# Patient Record
Sex: FEMALE | Race: WHITE | URBAN - METROPOLITAN AREA
[De-identification: names, ages, dates, MRNs, and addresses within clinical notes are randomized per-mention and may not be internally consistent; named-entity substitution may affect disease eponyms.]

---

## 2020-02-05 ENCOUNTER — APPOINTMENT (RX ONLY)
Dept: URBAN - METROPOLITAN AREA CLINIC 108 | Facility: CLINIC | Age: 55
Setting detail: DERMATOLOGY
End: 2020-02-05

## 2020-02-05 DIAGNOSIS — L81.1 CHLOASMA: ICD-10-CM

## 2020-02-05 DIAGNOSIS — L91.8 OTHER HYPERTROPHIC DISORDERS OF THE SKIN: ICD-10-CM

## 2020-02-05 DIAGNOSIS — D18.0 HEMANGIOMA: ICD-10-CM

## 2020-02-05 PROBLEM — D18.01 HEMANGIOMA OF SKIN AND SUBCUTANEOUS TISSUE: Status: ACTIVE | Noted: 2020-02-05

## 2020-02-05 PROCEDURE — 99203 OFFICE O/P NEW LOW 30 MIN: CPT

## 2020-02-05 PROCEDURE — ? COUNSELING

## 2020-02-05 PROCEDURE — ? PRESCRIPTION

## 2020-02-05 ASSESSMENT — LOCATION SIMPLE DESCRIPTION DERM
LOCATION SIMPLE: LEFT CHEEK
LOCATION SIMPLE: RIGHT ANTERIOR NECK
LOCATION SIMPLE: CHEST

## 2020-02-05 ASSESSMENT — LOCATION ZONE DERM
LOCATION ZONE: TRUNK
LOCATION ZONE: FACE
LOCATION ZONE: NECK

## 2020-02-05 ASSESSMENT — LOCATION DETAILED DESCRIPTION DERM
LOCATION DETAILED: MIDDLE STERNUM
LOCATION DETAILED: RIGHT INFERIOR LATERAL NECK
LOCATION DETAILED: LEFT INFERIOR CENTRAL MALAR CHEEK

## 2021-12-09 ENCOUNTER — APPOINTMENT (RX ONLY)
Dept: URBAN - METROPOLITAN AREA CLINIC 108 | Facility: CLINIC | Age: 56
Setting detail: DERMATOLOGY
End: 2021-12-09

## 2021-12-09 ENCOUNTER — RX ONLY (OUTPATIENT)
Age: 56
Setting detail: RX ONLY
End: 2021-12-09

## 2021-12-09 DIAGNOSIS — L81.0 POSTINFLAMMATORY HYPERPIGMENTATION: ICD-10-CM

## 2021-12-09 DIAGNOSIS — L70.0 ACNE VULGARIS: ICD-10-CM

## 2021-12-09 DIAGNOSIS — L65.9 NONSCARRING HAIR LOSS, UNSPECIFIED: ICD-10-CM

## 2021-12-09 PROCEDURE — ? ORDER TESTS

## 2021-12-09 PROCEDURE — ? ADDITIONAL NOTES

## 2021-12-09 PROCEDURE — 11901 INJECT SKIN LESIONS >7: CPT

## 2021-12-09 PROCEDURE — 99214 OFFICE O/P EST MOD 30 MIN: CPT | Mod: 25

## 2021-12-09 PROCEDURE — ? FULL BODY SKIN EXAM - DECLINED

## 2021-12-09 PROCEDURE — ? PRESCRIPTION

## 2021-12-09 PROCEDURE — ? COUNSELING

## 2021-12-09 PROCEDURE — ? INTRALESIONAL KENALOG

## 2021-12-09 RX ORDER — TACROLIMUS 1 MG/G
OINTMENT TOPICAL
Qty: 60 | Refills: 3 | Status: ERX | COMMUNITY
Start: 2021-12-09

## 2021-12-09 RX ORDER — HALOBETASOL PROPIONATE 0.5 MG/G
AEROSOL, FOAM TOPICAL
Qty: 50 | Refills: 3 | Status: CANCELLED
Stop reason: CLARIF

## 2021-12-09 RX ORDER — TRETIONIN 0.25 MG/G
CREAM TOPICAL QHS
Qty: 45 | Refills: 3 | Status: ERX | COMMUNITY
Start: 2021-12-09

## 2021-12-09 RX ADMIN — TRETIONIN: 0.25 CREAM TOPICAL at 00:00

## 2021-12-09 RX ADMIN — TACROLIMUS: 1 OINTMENT TOPICAL at 00:00

## 2021-12-09 ASSESSMENT — LOCATION DETAILED DESCRIPTION DERM
LOCATION DETAILED: LEFT SUPERIOR LATERAL FOREHEAD
LOCATION DETAILED: SUPERIOR MID FOREHEAD
LOCATION DETAILED: LEFT INFERIOR MEDIAL FOREHEAD
LOCATION DETAILED: LEFT MEDIAL FOREHEAD
LOCATION DETAILED: LEFT LATERAL FOREHEAD
LOCATION DETAILED: RIGHT CENTRAL FRONTAL SCALP
LOCATION DETAILED: INFERIOR MID FOREHEAD
LOCATION DETAILED: RIGHT MEDIAL FOREHEAD
LOCATION DETAILED: HAIR
LOCATION DETAILED: RIGHT INFERIOR MEDIAL FOREHEAD

## 2021-12-09 ASSESSMENT — LOCATION ZONE DERM
LOCATION ZONE: FACE
LOCATION ZONE: SCALP

## 2021-12-09 ASSESSMENT — LOCATION SIMPLE DESCRIPTION DERM
LOCATION SIMPLE: LEFT FOREHEAD
LOCATION SIMPLE: INFERIOR FOREHEAD
LOCATION SIMPLE: RIGHT FOREHEAD
LOCATION SIMPLE: SCALP
LOCATION SIMPLE: HAIR
LOCATION SIMPLE: SUPERIOR FOREHEAD

## 2021-12-09 NOTE — PROCEDURE: ORDER TESTS
Performing Laboratory: -[id number]
Bill For Surgical Tray: no
Billing Type: Third-Party Bill
Expected Date Of Service: 12/09/2021

## 2021-12-09 NOTE — PROCEDURE: COUNSELING
Detail Level: Detailed
Topical Clindamycin Pregnancy And Lactation Text: This medication is Pregnancy Category B and is considered safe during pregnancy. It is unknown if it is excreted in breast milk.
Use Enhanced Medication Counseling?: No
Birth Control Pills Counseling: Birth Control Pill Counseling: I discussed with the patient the potential side effects of OCPs including but not limited to increased risk of stroke, heart attack, thrombophlebitis, deep venous thrombosis, hepatic adenomas, breast changes, GI upset, headaches, and depression.  The patient verbalized understanding of the proper use and possible adverse effects of OCPs. All of the patient's questions and concerns were addressed.
Tetracycline Counseling: Patient counseled regarding possible photosensitivity and increased risk for sunburn.  Patient instructed to avoid sunlight, if possible.  When exposed to sunlight, patients should wear protective clothing, sunglasses, and sunscreen.  The patient was instructed to call the office immediately if the following severe adverse effects occur:  hearing changes, easy bruising/bleeding, severe headache, or vision changes.  The patient verbalized understanding of the proper use and possible adverse effects of tetracycline.  All of the patient's questions and concerns were addressed. Patient understands to avoid pregnancy while on therapy due to potential birth defects.
Sarecycline Counseling: Patient advised regarding possible photosensitivity and discoloration of the teeth, skin, lips, tongue and gums.  Patient instructed to avoid sunlight, if possible.  When exposed to sunlight, patients should wear protective clothing, sunglasses, and sunscreen.  The patient was instructed to call the office immediately if the following severe adverse effects occur:  hearing changes, easy bruising/bleeding, severe headache, or vision changes.  The patient verbalized understanding of the proper use and possible adverse effects of sarecycline.  All of the patient's questions and concerns were addressed.
Isotretinoin Pregnancy And Lactation Text: This medication is Pregnancy Category X and is considered extremely dangerous during pregnancy. It is unknown if it is excreted in breast milk.
Topical Retinoid Pregnancy And Lactation Text: This medication is Pregnancy Category C. It is unknown if this medication is excreted in breast milk.
Doxycycline Pregnancy And Lactation Text: This medication is Pregnancy Category D and not consider safe during pregnancy. It is also excreted in breast milk but is considered safe for shorter treatment courses.
Azithromycin Counseling:  I discussed with the patient the risks of azithromycin including but not limited to GI upset, allergic reaction, drug rash, diarrhea, and yeast infections.
Birth Control Pills Pregnancy And Lactation Text: This medication should be avoided if pregnant and for the first 30 days post-partum.
Tetracycline Pregnancy And Lactation Text: This medication is Pregnancy Category D and not consider safe during pregnancy. It is also excreted in breast milk.
High Dose Vitamin A Counseling: Side effects reviewed, pt to contact office should one occur.
Topical Sulfur Applications Counseling: Topical Sulfur Counseling: Patient counseled that this medication may cause skin irritation or allergic reactions.  In the event of skin irritation, the patient was advised to reduce the amount of the drug applied or use it less frequently.   The patient verbalized understanding of the proper use and possible adverse effects of topical sulfur application.  All of the patient's questions and concerns were addressed.
Tazorac Counseling:  Patient advised that medication is irritating and drying.  Patient may need to apply sparingly and wash off after an hour before eventually leaving it on overnight.  The patient verbalized understanding of the proper use and possible adverse effects of tazorac.  All of the patient's questions and concerns were addressed.
Erythromycin Counseling:  I discussed with the patient the risks of erythromycin including but not limited to GI upset, allergic reaction, drug rash, diarrhea, increase in liver enzymes, and yeast infections.
Azithromycin Pregnancy And Lactation Text: This medication is considered safe during pregnancy and is also secreted in breast milk.
Dapsone Counseling: I discussed with the patient the risks of dapsone including but not limited to hemolytic anemia, agranulocytosis, rashes, methemoglobinemia, kidney failure, peripheral neuropathy, headaches, GI upset, and liver toxicity.  Patients who start dapsone require monitoring including baseline LFTs and weekly CBCs for the first month, then every month thereafter.  The patient verbalized understanding of the proper use and possible adverse effects of dapsone.  All of the patient's questions and concerns were addressed.
Benzoyl Peroxide Counseling: Patient counseled that medicine may cause skin irritation and bleach clothing.  In the event of skin irritation, the patient was advised to reduce the amount of the drug applied or use it less frequently.   The patient verbalized understanding of the proper use and possible adverse effects of benzoyl peroxide.  All of the patient's questions and concerns were addressed.
High Dose Vitamin A Pregnancy And Lactation Text: High dose vitamin A therapy is contraindicated during pregnancy and breast feeding.
Topical Sulfur Applications Pregnancy And Lactation Text: This medication is Pregnancy Category C and has an unknown safety profile during pregnancy. It is unknown if this topical medication is excreted in breast milk.
Erythromycin Pregnancy And Lactation Text: This medication is Pregnancy Category B and is considered safe during pregnancy. It is also excreted in breast milk.
Spironolactone Counseling: Patient advised regarding risks of diarrhea, abdominal pain, hyperkalemia, birth defects (for female patients), liver toxicity and renal toxicity. The patient may need blood work to monitor liver and kidney function and potassium levels while on therapy. The patient verbalized understanding of the proper use and possible adverse effects of spironolactone.  All of the patient's questions and concerns were addressed.
Tazorac Pregnancy And Lactation Text: This medication is not safe during pregnancy. It is unknown if this medication is excreted in breast milk.
Bactrim Counseling:  I discussed with the patient the risks of sulfa antibiotics including but not limited to GI upset, allergic reaction, drug rash, diarrhea, dizziness, photosensitivity, and yeast infections.  Rarely, more serious reactions can occur including but not limited to aplastic anemia, agranulocytosis, methemoglobinemia, blood dyscrasias, liver or kidney failure, lung infiltrates or desquamative/blistering drug rashes.
Benzoyl Peroxide Pregnancy And Lactation Text: This medication is Pregnancy Category C. It is unknown if benzoyl peroxide is excreted in breast milk.
Minocycline Counseling: Patient advised regarding possible photosensitivity and discoloration of the teeth, skin, lips, tongue and gums.  Patient instructed to avoid sunlight, if possible.  When exposed to sunlight, patients should wear protective clothing, sunglasses, and sunscreen.  The patient was instructed to call the office immediately if the following severe adverse effects occur:  hearing changes, easy bruising/bleeding, severe headache, or vision changes.  The patient verbalized understanding of the proper use and possible adverse effects of minocycline.  All of the patient's questions and concerns were addressed.
Dapsone Pregnancy And Lactation Text: This medication is Pregnancy Category C and is not considered safe during pregnancy or breast feeding.
Topical Clindamycin Counseling: Patient counseled that this medication may cause skin irritation or allergic reactions.  In the event of skin irritation, the patient was advised to reduce the amount of the drug applied or use it less frequently.   The patient verbalized understanding of the proper use and possible adverse effects of clindamycin.  All of the patient's questions and concerns were addressed.
Bactrim Pregnancy And Lactation Text: This medication is Pregnancy Category D and is known to cause fetal risk.  It is also excreted in breast milk.
Topical Retinoid counseling:  Patient advised to apply a pea-sized amount only at bedtime and wait 30 minutes after washing their face before applying.  If too drying, patient may add a non-comedogenic moisturizer. The patient verbalized understanding of the proper use and possible adverse effects of retinoids.  All of the patient's questions and concerns were addressed.
Spironolactone Pregnancy And Lactation Text: This medication can cause feminization of the male fetus and should be avoided during pregnancy. The active metabolite is also found in breast milk.
Isotretinoin Counseling: Patient should get monthly blood tests, not donate blood, not drive at night if vision affected, not share medication, and not undergo elective surgery for 6 months after tx completed. Side effects reviewed, pt to contact office should one occur.
Doxycycline Counseling:  Patient counseled regarding possible photosensitivity and increased risk for sunburn.  Patient instructed to avoid sunlight, if possible.  When exposed to sunlight, patients should wear protective clothing, sunglasses, and sunscreen.  The patient was instructed to call the office immediately if the following severe adverse effects occur:  hearing changes, easy bruising/bleeding, severe headache, or vision changes.  The patient verbalized understanding of the proper use and possible adverse effects of doxycycline.  All of the patient's questions and concerns were addressed.

## 2022-01-20 ENCOUNTER — APPOINTMENT (RX ONLY)
Dept: URBAN - METROPOLITAN AREA CLINIC 108 | Facility: CLINIC | Age: 57
Setting detail: DERMATOLOGY
End: 2022-01-20

## 2022-01-20 ENCOUNTER — RX ONLY (OUTPATIENT)
Age: 57
Setting detail: RX ONLY
End: 2022-01-20

## 2022-01-20 DIAGNOSIS — L65.9 NONSCARRING HAIR LOSS, UNSPECIFIED: ICD-10-CM

## 2022-01-20 PROCEDURE — ? ADDITIONAL NOTES

## 2022-01-20 PROCEDURE — 11901 INJECT SKIN LESIONS >7: CPT

## 2022-01-20 PROCEDURE — ? PATIENT SPECIFIC COUNSELING

## 2022-01-20 PROCEDURE — ? INTRALESIONAL KENALOG

## 2022-01-20 PROCEDURE — ? COUNSELING

## 2022-01-20 PROCEDURE — ? FULL BODY SKIN EXAM - DECLINED

## 2022-01-20 PROCEDURE — ? PRESCRIPTION MEDICATION MANAGEMENT

## 2022-01-20 RX ORDER — TRETIONIN 0.25 MG/G
CREAM TOPICAL QHS
Qty: 45 | Refills: 3 | Status: ERX

## 2022-01-20 ASSESSMENT — LOCATION SIMPLE DESCRIPTION DERM
LOCATION SIMPLE: SUPERIOR FOREHEAD
LOCATION SIMPLE: SCALP
LOCATION SIMPLE: RIGHT FOREHEAD
LOCATION SIMPLE: INFERIOR FOREHEAD
LOCATION SIMPLE: HAIR
LOCATION SIMPLE: LEFT FOREHEAD

## 2022-01-20 ASSESSMENT — LOCATION DETAILED DESCRIPTION DERM
LOCATION DETAILED: INFERIOR MID FOREHEAD
LOCATION DETAILED: HAIR
LOCATION DETAILED: LEFT MEDIAL FOREHEAD
LOCATION DETAILED: LEFT INFERIOR MEDIAL FOREHEAD
LOCATION DETAILED: RIGHT MEDIAL FOREHEAD
LOCATION DETAILED: LEFT SUPERIOR LATERAL FOREHEAD
LOCATION DETAILED: RIGHT CENTRAL FRONTAL SCALP
LOCATION DETAILED: LEFT LATERAL FOREHEAD
LOCATION DETAILED: SUPERIOR MID FOREHEAD

## 2022-01-20 ASSESSMENT — LOCATION ZONE DERM
LOCATION ZONE: FACE
LOCATION ZONE: SCALP

## 2022-01-20 NOTE — PROCEDURE: PRESCRIPTION MEDICATION MANAGEMENT
Render In Strict Bullet Format?: No
Detail Level: Zone
Plan: Continue applying tacrolimus BID and Men’s Rogaine QHS.

## 2022-01-20 NOTE — PROCEDURE: PATIENT SPECIFIC COUNSELING
Reviewed blood work with patient and informed to see Rheumatologist. \\[facility] PB Reviewed blood work with patient and informed to see Rheumatologist. \\[facility] PB

## 2022-03-03 ENCOUNTER — APPOINTMENT (RX ONLY)
Dept: URBAN - METROPOLITAN AREA CLINIC 108 | Facility: CLINIC | Age: 57
Setting detail: DERMATOLOGY
End: 2022-03-03

## 2022-03-03 DIAGNOSIS — L65.9 NONSCARRING HAIR LOSS, UNSPECIFIED: ICD-10-CM | Status: INADEQUATELY CONTROLLED

## 2022-03-03 PROCEDURE — ? INTRALESIONAL KENALOG

## 2022-03-03 PROCEDURE — ? PATIENT SPECIFIC COUNSELING

## 2022-03-03 PROCEDURE — ? ADDITIONAL NOTES

## 2022-03-03 PROCEDURE — ? FULL BODY SKIN EXAM - DECLINED

## 2022-03-03 PROCEDURE — 11901 INJECT SKIN LESIONS >7: CPT

## 2022-03-03 PROCEDURE — ? PRESCRIPTION MEDICATION MANAGEMENT

## 2022-03-03 PROCEDURE — ? COUNSELING

## 2022-03-03 ASSESSMENT — LOCATION DETAILED DESCRIPTION DERM
LOCATION DETAILED: HAIR
LOCATION DETAILED: RIGHT CENTRAL FRONTAL SCALP
LOCATION DETAILED: INFERIOR MID FOREHEAD
LOCATION DETAILED: LEFT MEDIAL FOREHEAD
LOCATION DETAILED: LEFT INFERIOR MEDIAL FOREHEAD
LOCATION DETAILED: LEFT SUPERIOR LATERAL FOREHEAD
LOCATION DETAILED: RIGHT MEDIAL FOREHEAD
LOCATION DETAILED: LEFT LATERAL FOREHEAD
LOCATION DETAILED: SUPERIOR MID FOREHEAD

## 2022-03-03 ASSESSMENT — LOCATION SIMPLE DESCRIPTION DERM
LOCATION SIMPLE: SUPERIOR FOREHEAD
LOCATION SIMPLE: RIGHT FOREHEAD
LOCATION SIMPLE: INFERIOR FOREHEAD
LOCATION SIMPLE: HAIR
LOCATION SIMPLE: SCALP
LOCATION SIMPLE: LEFT FOREHEAD

## 2022-03-03 ASSESSMENT — LOCATION ZONE DERM
LOCATION ZONE: SCALP
LOCATION ZONE: FACE

## 2022-03-03 NOTE — PROCEDURE: PATIENT SPECIFIC COUNSELING
Bx will be considered at FU if no improvement. Pt did have the appointment with the rheum and was not given any confirmatory diagnosis but he would like to see her again in 3 months and repeat labs.
Detail Level: Detailed

## 2022-04-05 ENCOUNTER — APPOINTMENT (RX ONLY)
Dept: URBAN - METROPOLITAN AREA CLINIC 108 | Facility: CLINIC | Age: 57
Setting detail: DERMATOLOGY
End: 2022-04-05

## 2022-04-05 DIAGNOSIS — L65.9 NONSCARRING HAIR LOSS, UNSPECIFIED: ICD-10-CM | Status: INADEQUATELY CONTROLLED

## 2022-04-05 PROCEDURE — ? PATIENT SPECIFIC COUNSELING

## 2022-04-05 PROCEDURE — ? COUNSELING

## 2022-04-05 PROCEDURE — 99213 OFFICE O/P EST LOW 20 MIN: CPT

## 2022-04-05 PROCEDURE — ? ADDITIONAL NOTES

## 2022-04-05 PROCEDURE — ? FULL BODY SKIN EXAM - DECLINED

## 2022-04-05 ASSESSMENT — LOCATION SIMPLE DESCRIPTION DERM: LOCATION SIMPLE: RIGHT FOREHEAD

## 2022-04-05 ASSESSMENT — LOCATION ZONE DERM: LOCATION ZONE: FACE

## 2022-04-05 ASSESSMENT — LOCATION DETAILED DESCRIPTION DERM: LOCATION DETAILED: RIGHT MEDIAL FOREHEAD

## 2022-09-01 NOTE — PROCEDURE: PATIENT SPECIFIC COUNSELING
DISCHARGE INSTRUCTIONS: Jayna Jo 64 [phone]    Bring these instructions with you to your Outpatient Physician appointments so they can order and follow-up any additional lab work or imaging recommended at time of discharge  It  is you or your caregivers responsibility to obtain follow-up MEDICATION REFILLS  As indicated through your Primary Care Physician (PCP) and other outpatient specialty provider(s) after discharge  Please follow-up with your PCP as soon as possible after discharge to set-up follow-up management and when appropriate refills  You remain a fall and injury risk which could be severe  - Your risk of fall has decreased however since admission to acute rehab  Caregiver training has been completed with our staff  - Appropriate supervision +/- assistance as instructed during your rehab course is recommended to decrease risk of fall and injury  - Continue skilled therapy as discussed after discharge to further decrease this risk    If you (or your health care proxy) have any questions or concerns regarding your acute rehabilitation stay including issues with medications, rehabilitation, and follow-up plan, please call:          [address] [place] [address] at [phone] or [phone]  Should you develop fevers, chills, new weakness, changes in sensation, difficulty speaking, facial weakness, confusion, shortness of breath, chest pain, or other concerning symptoms please call 911 and/or obtain transportation to nearest ER immediately  Should you develop feelings of significant hopelessness, severe depression, severe anxiety, or suicide you should call 911 or obtain transportation to nearest ER  [employer] suicide and crisis lifeline call "65" [phone] [facility] is [phone] and is available 24 hrs/7 days a week     [facility] [phone] is available 24 hrs/7 days for
mental health  [facility] AT [place] [phone] is available 24 hrs/7 days for mental health   [facility], BEHAVIORAL MEDICINE AT [facility]  [phone]    PHYSICIANS to see:  Please see your doctors listed in the follow up providers section of your discharge paperwork, and take the discharge paperwork with you to your appointments  LAB WORK recommended after discharge: Follow-up lab work at discretion of your outpatient physicians to be determined at time of your future appointments  IMAGING to follow-up:  Follow-up imaging as discussed with your recent physicians or at discretion of your outpatient physicians to be determined at time of your appointments  WEIGHTBEARING/ACTIVITY PRECAUTIONS to follow:  Weightbearing as tolerated  Driving restrictions: You are recommended against driving until cleared by an outpatient physician  Work restrictions: You should NOT return to work (if working) until cleared by an outpatient physician  You should not operate heavy machinery (if applicable) until cleared by an outpatient physician  Alcohol restrictions: You are recommended to not drink alcohol at this time unless cleared by an outpatient physician  Combining alcohol with your current medications can increase your risk of injury which could be severe  Smoking restrictions: You are recommended to not smoke nicotine  Smoking increases your risk of heart attack, stroke, emphysema/COPD, and lung cancer  MEDICATIONS:  Please see a full list of your medications outlined in the After Visit Summary that is attached to these Discharge Instructions  Please note changes may have been made to your medications please refer to your discharge paperwork for your current medications and take this list with you to all your doctors appointments for your doctors to review    Please do not resume a home medication unless the medication reconciliation sheet indicates to do so, please do not
we discussed performing a bx today or having the hair specialist at  Dr. Warren or Dr. Ling perform and read bx, minimal improvement seen with both topical rogaine as well as 3 treatments with ilk...pt did follow up with rheum after high positive PB but no confirmatory diagnosis was made, if pt cannot get into be seen by specialist or they do not take her insurance we will bring her back for a bx
assume that a medication that you were given a prescription for is the same as a medication you have at home based on both medications having the same name as dosages and frequency may have changed  Unless specifically noted in your medication list provided to you in your discharge paper work do not resume prior vitamins, minerals, or supplements you may have been taking prior to your hospitalization unless instructed by an outpatient physician in the future  Discuss with your primary care at next visit if applicable  Blood thinners prescribed to optimize long and short term health and decrease risk of complications but with risks related to bleeding and other complications  Aspirin instructions  TAKE aspirin daily unless instructed otherwise by a doctor  This medication will need to be managed by your outpatient physician(s) after discharge  Follow-up with the appropriate provider as soon as possible to ensure appropriate use  This is a blood thinner  It is being recommended for use by you (or your family) to decrease the risk of clotting which can be severely disabling and even life-threatening  Even when provided as recommended it can cause severe disabling and even life-threatening bleeding  Too much or too little of this blood thinner further increases your risk of this medication causing serious and even life-threatening complications such as severe bleeding, clots, strokes, and death  With that said, at this time based on best available evidence and consensus agreement, your physicians recommend you take aspirin based on your overall risks and benefits in your specific medical situation  If you (or your family/caregiver) notice black stools, bloody stools, vomit blood, develop new weakness, slurred speech, confusion or have any other concerning symptoms call 911 or obtain transportation to nearest emergency room immediately         Sedating Medications with increased risk of
Detail Level: Detailed
complications:    Methocarbamol (Robaxin) pain/muscle spasm medication has been used to help your acute pain and spasms  You tolerated this medication adequately during your recent hospital stay  - Do not take with alcohol (or marijuana/cannabis) while on this medication as this can cause increased confusion, breathing problems, falls, and severe injury  - This would be the second medication to wean down on  You can stop abruptlyt or just reduce the frequency down to 2-3 times per day and eventually perhaps daily as needed  If pain increases as you wean, you may return to a higher frequency not to exceed 4 times daily  Gabapentin has been used to help pain  You tolerated this medication adequately during your recent hospital stay  - Take 300 mg at night  - Do not stop this medication abruptly as this can cause seizures  - If stopping medication I would decrease by 1 capsule (100mg) every 3 days  - This medication can increase risk of confusion, fall, and injury although you did tolerate adequately during rehab course  - This would be the first medication to wean off for pain    - If pain not adequately controlled with primary providers after discharge you may request referral or request appointment to outpatient pain management specialist such as:     [facility]   [employer]  [id number] ([id number])      [address] specific to you:    Diabetes Management:  You are to continue on Metformin, but are currently on a lower dose at this time  If you feel like sugars are steadily increasing, consider going back to prior metformin dose under guidance of your PCP  Please check your blood sugars 2 times daily before breakfast and dinner and record them to provide to your doctors, contact your family doctor as soon as possible for blood sugars higher than 220 or lower than 100        Follow-up with PCP/family doctor regularly to ensure blood
sugars remain adequately controlled  Monitor for signs or symptoms of low blood sugar (hypoglycemia)- such as sweating, trembling, feeling hungry, worried, more tired (More severe signs of hypoglycemia could be trouble walking, confusion, passing out)  - If present obtain blood sugar    If blood sugar less than 70 take quick source of sugar such as:  - at least half cup of juice   - 4-5 saltine crackers  - 1 tablespoon of sugar or honey  - 3-4 glucose tablets  (Avoid foods that you have history of allergy)    Recheck blood sugar within 30 minutes  If still low repeat providing additional quick sources of sugar  If still not improving or symptoms worsening/not improving seek medical attention right away or obtain medical transport/call 911  Hypertension Management:  Only take the medications prescribed for you at time of discharge - overly high or low blood pressure increases your risk for health complications  You are currently off your Lisinopril and should hold unless specified to restart by the cardiologist or PCP  Please keep a record of blood pressures 2x daily  Follow-up with PCP/family doctor regularly to ensure blood pressure remains adequately controlled  Please check your blood pressure prior to taking your blood pressure medications and keep a log that you will bring with you to your follow-up doctors' appointments  >Please contact your family doctor or cardiologist immediately for a blood pressure below 100/50 and do not take your blood pressure medications until speaking with them  >Please contact your family doctor or cardiologist as soon as possible for blood pressure greater than 160/100  Pacemaker: You recently had an implantable pacemaker inserted  Contact cardiology and the cardiology device clinic as outlined under follow-up providers with any questions and to ensure appropriate follow-up after discharge  Acetaminophen (Tylenol) Dosing Warning:     You may have up
to 3000 mg of acetaminophen (Tylenol) from all sources spread out over a 24 hours period  Do not have more than that, as this can increase your risk of liver injury which can be serious  Bowel management:  - Ideally you would have 1 well formed BM every 1-2 days  Adjust bowel regimen based on that goal or as close to that as possible  - Start with taking miralax 1-2 times per day and senna twice daily  - If still constipated you can take either oral or by rectum dulcolax (but not at the same time)  - If still constipated after dulcolax than you may temporarily take mag citrate x1 (but should try to avoid this if possible as this can occasionally cause abnormal electrolytes and other complications and notify your physician if you need to take)  - If unable to go for more than 4 days notify your physician for additional recommendations    - Follow-up with your primary care physician at next visit  - If you develop significant abdominal pain, nausea/vomiting, or other concerns seek medical attention right away  For pain - try to use over the counter topicals (creams/gels) as discussed or acetaminophen 1-2 regular or extra-strength Tylenol up to 2-3 times per day  Could consider heat or ice as well maximum 20 minutes at a time  Do not use heat or ice if using topicals at the same time  Notify primary care and/or orthopedics for poorly controlled pain for additional recommendations  NSAID Warning:  Please avoid NSAID (including but not limited to advil, aleve, motrin, naproxen, ibuprofen, mobic, meloxicam, diclofenac etc) medications as NSAID medications may increase your risk of bleeding (which can be life-threatening), stroke, worsening kidney disease, heart attack, developing/worsening GI ulcers, worsening heart failure, worsening liver (cirrhosis) disease, potentially delay bone healing        Please note a summary of your hospital stay with relevant information for your doctors will try to be sent
to them  Please confirm with your doctors at your follow up visits that they have received this summary and have them contact [address] [place] [address] if they have not received them along with any other medical records they may require       Hamida Arenas Phone Number:  [phone]